# Patient Record
Sex: FEMALE | Race: WHITE | NOT HISPANIC OR LATINO | Employment: FULL TIME | URBAN - METROPOLITAN AREA
[De-identification: names, ages, dates, MRNs, and addresses within clinical notes are randomized per-mention and may not be internally consistent; named-entity substitution may affect disease eponyms.]

---

## 2024-02-21 ENCOUNTER — OFFICE VISIT (OUTPATIENT)
Dept: PRIMARY CARE | Facility: CLINIC | Age: 34
End: 2024-02-21
Payer: COMMERCIAL

## 2024-02-21 VITALS
BODY MASS INDEX: 40.74 KG/M2 | HEIGHT: 71 IN | WEIGHT: 291 LBS | HEART RATE: 68 BPM | TEMPERATURE: 96.9 F | SYSTOLIC BLOOD PRESSURE: 125 MMHG | DIASTOLIC BLOOD PRESSURE: 84 MMHG | OXYGEN SATURATION: 98 %

## 2024-02-21 DIAGNOSIS — Z00.00 ANNUAL PHYSICAL EXAM: Primary | ICD-10-CM

## 2024-02-21 DIAGNOSIS — E66.01 CLASS 3 SEVERE OBESITY WITH SERIOUS COMORBIDITY AND BODY MASS INDEX (BMI) OF 40.0 TO 44.9 IN ADULT, UNSPECIFIED OBESITY TYPE (MULTI): ICD-10-CM

## 2024-02-21 PROBLEM — O99.810 GLUCOSE INTOLERANCE OF PREGNANCY (HHS-HCC): Status: RESOLVED | Noted: 2020-09-22 | Resolved: 2024-02-21

## 2024-02-21 PROBLEM — H65.03 ACUTE SEROUS OTITIS MEDIA, BILATERAL: Status: RESOLVED | Noted: 2024-02-21 | Resolved: 2024-02-21

## 2024-02-21 PROBLEM — J06.9 VIRAL URI WITH COUGH: Status: RESOLVED | Noted: 2024-02-21 | Resolved: 2024-02-21

## 2024-02-21 PROBLEM — O13.3 GESTATIONAL HYPERTENSION, THIRD TRIMESTER (HHS-HCC): Status: RESOLVED | Noted: 2020-09-22 | Resolved: 2024-02-21

## 2024-02-21 PROBLEM — R05.8 NONPRODUCTIVE COUGH: Status: RESOLVED | Noted: 2024-02-21 | Resolved: 2024-02-21

## 2024-02-21 PROBLEM — R05.9 COUGH: Status: RESOLVED | Noted: 2024-02-21 | Resolved: 2024-02-21

## 2024-02-21 PROBLEM — J01.90 ACUTE SINUSITIS: Status: RESOLVED | Noted: 2024-02-21 | Resolved: 2024-02-21

## 2024-02-21 PROBLEM — R50.9 FEVER: Status: RESOLVED | Noted: 2024-02-21 | Resolved: 2024-02-21

## 2024-02-21 PROBLEM — Z86.16 HISTORY OF 2019 NOVEL CORONAVIRUS DISEASE (COVID-19): Status: RESOLVED | Noted: 2020-09-04 | Resolved: 2024-02-21

## 2024-02-21 PROBLEM — J10.1 INFLUENZA A: Status: RESOLVED | Noted: 2024-02-21 | Resolved: 2024-02-21

## 2024-02-21 PROBLEM — J02.0 STREP PHARYNGITIS: Status: RESOLVED | Noted: 2024-02-21 | Resolved: 2024-02-21

## 2024-02-21 PROBLEM — J02.9 PHARYNGITIS, ACUTE: Status: RESOLVED | Noted: 2024-02-21 | Resolved: 2024-02-21

## 2024-02-21 PROBLEM — O99.820 GROUP B STREPTOCOCCUS CARRIER, ANTEPARTUM (HHS-HCC): Status: RESOLVED | Noted: 2020-09-23 | Resolved: 2024-02-21

## 2024-02-21 PROCEDURE — 99395 PREV VISIT EST AGE 18-39: CPT | Performed by: FAMILY MEDICINE

## 2024-02-21 PROCEDURE — 1036F TOBACCO NON-USER: CPT | Performed by: FAMILY MEDICINE

## 2024-02-21 PROCEDURE — 3008F BODY MASS INDEX DOCD: CPT | Performed by: FAMILY MEDICINE

## 2024-02-21 ASSESSMENT — PATIENT HEALTH QUESTIONNAIRE - PHQ9
1. LITTLE INTEREST OR PLEASURE IN DOING THINGS: NOT AT ALL
SUM OF ALL RESPONSES TO PHQ9 QUESTIONS 1 AND 2: 0
2. FEELING DOWN, DEPRESSED OR HOPELESS: NOT AT ALL

## 2024-02-21 ASSESSMENT — ENCOUNTER SYMPTOMS
POLYDIPSIA: 0
EYE PAIN: 0
ARTHRALGIAS: 0
DYSPHORIC MOOD: 0
BLOOD IN STOOL: 0
ABDOMINAL DISTENTION: 0
APPETITE CHANGE: 0
PHOTOPHOBIA: 0
VOICE CHANGE: 0
WHEEZING: 0
FEVER: 0
HYPERACTIVE: 0
CONFUSION: 0
COLOR CHANGE: 0
EYE ITCHING: 0
WEAKNESS: 0
VOMITING: 0
CHILLS: 0
TREMORS: 0
EYE REDNESS: 0
CONSTIPATION: 0
HEMATURIA: 0
COUGH: 0
RHINORRHEA: 0
ANAL BLEEDING: 0
HEADACHES: 0
RECTAL PAIN: 0
BRUISES/BLEEDS EASILY: 0
NUMBNESS: 0
ACTIVITY CHANGE: 0
SEIZURES: 0
SINUS PRESSURE: 0
NERVOUS/ANXIOUS: 0
CHEST TIGHTNESS: 0
SORE THROAT: 0
FLANK PAIN: 0
STRIDOR: 0
DIAPHORESIS: 0
SPEECH DIFFICULTY: 0
FATIGUE: 0
BACK PAIN: 0
CHOKING: 0
JOINT SWELLING: 0
LIGHT-HEADEDNESS: 0
UNEXPECTED WEIGHT CHANGE: 0
POLYPHAGIA: 0
DIARRHEA: 0
ABDOMINAL PAIN: 0
MYALGIAS: 0
NAUSEA: 0
FREQUENCY: 0
APNEA: 0
FACIAL SWELLING: 0
EYE DISCHARGE: 0
DIFFICULTY URINATING: 0
NECK PAIN: 0
AGITATION: 0
HALLUCINATIONS: 0
FACIAL ASYMMETRY: 0
SLEEP DISTURBANCE: 0
WOUND: 0
SINUS PAIN: 0
DYSURIA: 0
DIZZINESS: 0
PALPITATIONS: 0
ADENOPATHY: 0
TROUBLE SWALLOWING: 0
NECK STIFFNESS: 0
DECREASED CONCENTRATION: 0
SHORTNESS OF BREATH: 0

## 2024-02-21 NOTE — PROGRESS NOTES
Subjective   Patient ID: Robina Dumont is a 33 y.o. female who presents for New Patient Visit (New pt, Bw done aug 2023).  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  Here today for annual wellness exam and to establish care she has no significant past medical history except for obesity and she is currently in the bariatric surgery program at [facility]  She had originally been cared for in a no other hospital system and transferring care here  She today is not feeling well she is little bit of a headache she vomited this morning her stomach is not really hurting but her head is bothering her little bit no recent exposures to COVID or flu that she is aware of and she has not had a fever but normally she feels quite well is quite healthy no regular medications no significant past medical history except for obstetrical she had 2 pregnancies one of the excess fluid 1 with preeclampsia she has a history of gestational diabetes as well as pregnancy-induced hypertension both of which resolved with delivery and she is otherwise feeling well she has not had any chest pain chest tightness shortness of breath or blackouts with physical activity is going through the bariatric program and is going very well she is not a smoker she has family history of diabetes and breast cancer  Denies angina palpitations or syncope no blackouts with physical activity no fever chills or night sweats cycles are normal has a gynecologist no other new concerns            Review of Systems   Constitutional:  Negative for activity change, appetite change, chills, diaphoresis, fatigue, fever and unexpected weight change.   HENT:  Negative for congestion, dental problem, drooling, ear discharge, ear pain, facial swelling, hearing loss, mouth sores, nosebleeds, postnasal drip, rhinorrhea, sinus pressure, sinus pain, sneezing, sore throat, tinnitus, trouble swallowing and voice change.    Eyes:  Negative for photophobia, pain,  "discharge, redness, itching and visual disturbance.   Respiratory:  Negative for apnea, cough, choking, chest tightness, shortness of breath, wheezing and stridor.    Cardiovascular:  Negative for chest pain, palpitations and leg swelling.   Gastrointestinal:  Negative for abdominal distention, abdominal pain, anal bleeding, blood in stool, constipation, diarrhea, nausea, rectal pain and vomiting.   Endocrine: Negative for cold intolerance, heat intolerance, polydipsia, polyphagia and polyuria.   Genitourinary:  Negative for decreased urine volume, difficulty urinating, dyspareunia, dysuria, enuresis, flank pain, frequency, genital sores, hematuria, menstrual problem, pelvic pain, urgency, vaginal bleeding, vaginal discharge and vaginal pain.   Musculoskeletal:  Negative for arthralgias, back pain, gait problem, joint swelling, myalgias, neck pain and neck stiffness.   Skin:  Negative for color change, pallor, rash and wound.   Allergic/Immunologic: Negative for environmental allergies, food allergies and immunocompromised state.   Neurological:  Negative for dizziness, tremors, seizures, syncope, facial asymmetry, speech difficulty, weakness, light-headedness, numbness and headaches.   Hematological:  Negative for adenopathy. Does not bruise/bleed easily.   Psychiatric/Behavioral:  Negative for agitation, behavioral problems, confusion, decreased concentration, dysphoric mood, hallucinations, self-injury, sleep disturbance and suicidal ideas. The patient is not nervous/anxious and is not hyperactive.        Objective    /84   Pulse 68   Temp 36.1 °C (96.9 °F)   Ht 1.803 m (5' 11\")   Wt 132 kg (291 lb)   SpO2 98%   BMI 40.59 kg/m²    Physical Exam  Constitutional:       General: She is not in acute distress.     Appearance: Normal appearance. She is obese. She is not ill-appearing, toxic-appearing or diaphoretic.   HENT:      Head: Normocephalic and atraumatic.      Right Ear: Tympanic membrane, ear " canal and external ear normal. There is no impacted cerumen.      Left Ear: Tympanic membrane, ear canal and external ear normal. There is no impacted cerumen.      Nose: Nose normal. No congestion or rhinorrhea.      Mouth/Throat:      Mouth: Mucous membranes are dry.      Pharynx: Oropharynx is clear. No oropharyngeal exudate or posterior oropharyngeal erythema.   Eyes:      General: No scleral icterus.        Right eye: No discharge.         Left eye: No discharge.      Extraocular Movements: Extraocular movements intact.      Conjunctiva/sclera: Conjunctivae normal.      Pupils: Pupils are equal, round, and reactive to light.   Neck:      Vascular: No carotid bruit.   Cardiovascular:      Rate and Rhythm: Normal rate and regular rhythm.      Pulses: Normal pulses.      Heart sounds: Normal heart sounds. No murmur heard.     No friction rub. No gallop.   Pulmonary:      Effort: Pulmonary effort is normal. No respiratory distress.      Breath sounds: Normal breath sounds. No stridor. No wheezing, rhonchi or rales.   Chest:      Chest wall: No tenderness.   Abdominal:      General: Abdomen is flat. Bowel sounds are normal. There is no distension.      Palpations: Abdomen is soft. There is no mass.      Tenderness: There is no abdominal tenderness. There is no right CVA tenderness, left CVA tenderness, guarding or rebound.      Hernia: No hernia is present.   Musculoskeletal:         General: No swelling, tenderness, deformity or signs of injury. Normal range of motion.      Cervical back: Normal range of motion and neck supple. No rigidity or tenderness.      Right lower leg: No edema.      Left lower leg: No edema.   Lymphadenopathy:      Cervical: No cervical adenopathy.   Skin:     General: Skin is warm and dry.      Capillary Refill: Capillary refill takes less than 2 seconds.      Coloration: Skin is not jaundiced or pale.      Findings: No bruising, erythema, lesion or rash.   Neurological:      General: No  focal deficit present.      Mental Status: She is alert.      Motor: No weakness.      Coordination: Coordination normal.      Gait: Gait normal.      Deep Tendon Reflexes: Reflexes normal.   Psychiatric:         Mood and Affect: Mood normal.         Behavior: Behavior normal.         Thought Content: Thought content normal.         Judgment: Judgment normal.         Assessment/Plan   Problem List Items Addressed This Visit       Annual physical exam - Primary     Unremarkable physical exam  Continue annual exams         Relevant Orders    Comprehensive Metabolic Panel    Lipid Panel    CBC    TSH    Hemoglobin A1C    Class 3 severe obesity with serious comorbidity and body mass index (BMI) of 40.0 to 44.9 in adult (CMS/[facility])     Above normal BMI nutrition and physical activity reviewed  Continue with bariatric program                 Linda Sharpe MD

## 2024-03-15 PROBLEM — H65.00 ACUTE SEROUS OTITIS MEDIA: Status: RESOLVED | Noted: 2024-03-15 | Resolved: 2024-03-15
